# Patient Record
Sex: MALE | Race: BLACK OR AFRICAN AMERICAN | NOT HISPANIC OR LATINO | Employment: FULL TIME | URBAN - METROPOLITAN AREA
[De-identification: names, ages, dates, MRNs, and addresses within clinical notes are randomized per-mention and may not be internally consistent; named-entity substitution may affect disease eponyms.]

---

## 2022-07-24 ENCOUNTER — HOSPITAL ENCOUNTER (EMERGENCY)
Facility: MEDICAL CENTER | Age: 47
End: 2022-07-24
Attending: EMERGENCY MEDICINE
Payer: COMMERCIAL

## 2022-07-24 VITALS
OXYGEN SATURATION: 98 % | DIASTOLIC BLOOD PRESSURE: 120 MMHG | BODY MASS INDEX: 36.55 KG/M2 | SYSTOLIC BLOOD PRESSURE: 218 MMHG | RESPIRATION RATE: 16 BRPM | WEIGHT: 269.84 LBS | TEMPERATURE: 98.4 F | HEIGHT: 72 IN | HEART RATE: 69 BPM

## 2022-07-24 DIAGNOSIS — I10 HYPERTENSION, UNSPECIFIED TYPE: ICD-10-CM

## 2022-07-24 DIAGNOSIS — Z76.0 MEDICATION REFILL: ICD-10-CM

## 2022-07-24 PROCEDURE — 99282 EMERGENCY DEPT VISIT SF MDM: CPT

## 2022-07-24 RX ORDER — ENALAPRIL MALEATE 5 MG/1
5 TABLET ORAL DAILY
Qty: 30 TABLET | Refills: 2 | Status: SHIPPED | OUTPATIENT
Start: 2022-07-24 | End: 2022-08-23

## 2022-07-24 RX ORDER — AMLODIPINE BESYLATE 5 MG/1
5 TABLET ORAL DAILY
Qty: 30 TABLET | Refills: 2 | Status: SHIPPED | OUTPATIENT
Start: 2022-07-24

## 2022-07-24 ASSESSMENT — PAIN DESCRIPTION - PAIN TYPE: TYPE: ACUTE PAIN

## 2022-07-24 NOTE — DISCHARGE INSTRUCTIONS
Please make sure that you still see your primary care doctor to discuss the risks alternatives and benefits for your medication  Good luck on your dental infection.  Keep taking the antibiotics    Come back anytime if you feel worse.

## 2022-07-24 NOTE — ED PROVIDER NOTES
"ED Provider Note    CHIEF COMPLAINT  Medication refill  High blood pressure    HPI  Grant Watts is a 46 y.o. male who presents chief complaint of medication refill.  Patient states he would like to take amlodipine.  He is now taking enalapril.  He states he took hydrochlorothiazide but \"it does not work\".  Patient states that he had a dental infection and unfortunately cannot manage his dental infection with his blood pressure being controlled.    He had blood pressure quite some time.  Patient states that he was between insurance got a new job was able to get his medications refilled and therefore needed to take care of things.    He now does have insurance he states he can follow-up with a primary care doctor.  He states that he does smoke.  He does drink energy drinks.  He should get more exercise and been under stress    He denies any fever chills denies any chest pain palpitations no leg swelling no weakness or numbness in any extremity.  Positive for anxiety when he checks his blood pressure and then it subsequently goes higher.  Usually at home is around 160.  Now it is elevated here.  This is normal for him when he is stressed    REVIEW OF SYSTEMS  See above    PAST MEDICAL HISTORY  Past Medical History:   Diagnosis Date   • Anxiety    • Hypertension        FAMILY HISTORY  History reviewed. No pertinent family history.    SOCIAL HISTORY  Social History     Socioeconomic History   • Marital status:    Tobacco Use   • Smoking status: Current Every Day Smoker     Packs/day: 0.50     Types: Cigarettes   • Smokeless tobacco: Never Used   Substance and Sexual Activity   • Alcohol use: No   • Drug use: Not Currently     Comment: marijuana       SURGICAL HISTORY  History reviewed. No pertinent surgical history.    CURRENT MEDICATIONS  Home Medications    **Home medications have not yet been reviewed for this encounter**         ALLERGIES  No Known Allergies    PHYSICAL EXAM  VITAL SIGNS: BP (!) 200/128   " Pulse 63   Temp 36.9 °C (98.4 °F) (Temporal)   Resp 16   Ht 1.829 m (6')   Wt 122 kg (269 lb 13.5 oz)   SpO2 100%   BMI 36.60 kg/m²    Constitutional: Well developed, Well nourished, no acute distress,   HENT: Normocephalic, Atraumatic, Oropharynx moist, No oral exudates, Nose normal.   Eyes: PERRLA, EOMI, Conjunctiva normal, No discharge.   Musculoskeletal: Neck normal range of motion, No tenderness, Supple,  Cardiovascular: Regular rhythm rate 63 no murmurs  Thorax & Lungs: Clear to auscultation bilaterally no crackles  Abdomen: Bowel sounds normal, Soft, No tenderness, No masses, No pulsatile masses.   Skin: Warm, Dry, No erythema, No rash.   : No CVA tenderness.   Neurologic: Alert & oriented , moves all extremities equally  Psychiatric:  Calm, not anxious        COURSE & MEDICAL DECISION MAKING  Pertinent Labs & Imaging studies reviewed. (See chart for details)  [facility] refill patient's blood pressure is here it sounds from history this most likely whitecoat hypertension he has no clinical signs of hypertensive emergency only unmanaged high blood pressure.  He will begin medication refills with 3 refills.  At this point we suggested amlodipine and enalapril low-dose.  The patient at this point was lecture regarding the benefits of hydrochlorothiazide encouraged him to take it but at this point the patient will take medication but would like to talk to his primary care doctor regarding that.  In addition we talked about exercise less energy drink smoking..  Right hand during the healthcare system a 46 and getting reestablished and nonpharmacological interventions for blood pressure medication.    46-year-old gentleman here primarily because he cannot get dental treatment because he has elevated blood pressure history of possible whitecoat hypertension with a history of hypertension and now filling out his medications.  Patient be started on 2 medications which she has taken in the past.  Patient will  start the low-dose of those medications.  I have encouraged him to follow-up with a primary care doctor we talked about increasing medications if needed.    FINAL IMPRESSION  1.  Hypertension  2.  Medication refill  3.      Electronically signed by: Magen Whittaker M.D., 7/24/2022 11:05 AM

## 2022-07-24 NOTE — ED TRIAGE NOTES
Chief Complaint   Patient presents with   • Tooth Ache   • Medication Refill      pt complains of abscess on lower tooth, currently on ABX. Pt also would like  refill on BP medication, has not been taking for the past 6 months.